# Patient Record
Sex: MALE | Race: WHITE | NOT HISPANIC OR LATINO | URBAN - NONMETROPOLITAN AREA
[De-identification: names, ages, dates, MRNs, and addresses within clinical notes are randomized per-mention and may not be internally consistent; named-entity substitution may affect disease eponyms.]

---

## 2019-12-31 ENCOUNTER — APPOINTMENT (RX ONLY)
Dept: URBAN - NONMETROPOLITAN AREA CLINIC 13 | Facility: CLINIC | Age: 24
Setting detail: DERMATOLOGY
End: 2019-12-31

## 2019-12-31 VITALS — HEIGHT: 68 IN | WEIGHT: 235 LBS

## 2019-12-31 DIAGNOSIS — L738 OTHER SPECIFIED DISEASES OF HAIR AND HAIR FOLLICLES: ICD-10-CM

## 2019-12-31 DIAGNOSIS — B35.6 TINEA CRURIS: ICD-10-CM

## 2019-12-31 DIAGNOSIS — L259 CONTACT DERMATITIS AND OTHER ECZEMA, UNSPECIFIED CAUSE: ICD-10-CM

## 2019-12-31 DIAGNOSIS — L663 OTHER SPECIFIED DISEASES OF HAIR AND HAIR FOLLICLES: ICD-10-CM

## 2019-12-31 DIAGNOSIS — L73.9 FOLLICULAR DISORDER, UNSPECIFIED: ICD-10-CM

## 2019-12-31 PROBLEM — L23.9 ALLERGIC CONTACT DERMATITIS, UNSPECIFIED CAUSE: Status: ACTIVE | Noted: 2019-12-31

## 2019-12-31 PROBLEM — L02.423 FURUNCLE OF RIGHT UPPER LIMB: Status: ACTIVE | Noted: 2019-12-31

## 2019-12-31 PROCEDURE — 99213 OFFICE O/P EST LOW 20 MIN: CPT

## 2019-12-31 PROCEDURE — ? COUNSELING

## 2019-12-31 PROCEDURE — ? PRESCRIPTION

## 2019-12-31 RX ORDER — NYSTATIN 100000 [USP'U]/G
APPLY TO AFFECTED AREAS POWDER TOPICAL QD
Qty: 1 | Refills: 2 | Status: ERX | COMMUNITY
Start: 2019-12-31

## 2019-12-31 RX ADMIN — NYSTATIN APPLY TO AFFECTED AREAS: 100000 POWDER TOPICAL at 00:00

## 2019-12-31 ASSESSMENT — LOCATION DETAILED DESCRIPTION DERM
LOCATION DETAILED: RIGHT VENTRAL DISTAL FOREARM
LOCATION DETAILED: SUPRAPUBIC SKIN

## 2019-12-31 ASSESSMENT — LOCATION ZONE DERM
LOCATION ZONE: TRUNK
LOCATION ZONE: ARM

## 2019-12-31 ASSESSMENT — LOCATION SIMPLE DESCRIPTION DERM
LOCATION SIMPLE: RIGHT FOREARM
LOCATION SIMPLE: GROIN

## 2019-12-31 NOTE — HPI: RASH
What Type Of Note Output Would You Prefer (Optional)?: Standard Output
Is The Patient Presenting As Previously Scheduled?: No, they are a work-in
How Severe Is Your Rash?: mild
Is This A New Presentation, Or A Follow-Up?: Rash
Additional History: HE ONLY COVERED TATOO WITH SERAN WRAP FOR 2 HOURS AFTER TATOO. THEY ONLY USED VASELINE.

## 2019-12-31 NOTE — PROCEDURE: COUNSELING
Patient Specific Counseling (Will Not Stick From Patient To Patient): DISCUSSED HE MOSTLY DID REACT TO AN INGREDIENT USED IN THE PROCESS OF HIS TATOO.\\n\\nHE IS UNAWARE IF ANY SURGICAL SOAP WAS USED TO PREP TATOO OR ONLY ALCOHOL. \\n\\nHE DOES KNOW THAT THE ARTIST USED SHAVING CREAM TO SHAVE THE AREA. DISCUSSED IT COULD BE AN INGREDIENT IN THE SHAVING CREAM THAT CAUSED THIS. ADVISED HIM TO DO A TEST SPOT ON HIS OTHER ARM.\\n\\nALSO COUNSELED HIM ON TATOO DYE. USUALLY THE RED, WHITE DYE ARE MORE REACTANT. \\n\\nDOES HAVE HX OF ECZEMA.\\n\\nHE CAN RETURN IN THE FUTURE FOR PATCH TESTING IF HE IS INTERESTED IN KNOWING EXACTLY WHAT HE IS ALLERGIC TO.\\n\\nSAMPLE GIVEN TODAY:CLOBETASOL OINTMENT BID X 3-5 DAYS.\\n\\n\\nIF FOLLICULITIS PUSTULES DO NOT IMPROVE IN THE NEXT FEW DAYS, HE WILL CALL FOR A PRESCRIPTION OF KEFLEX
Detail Level: Detailed
Patient Specific Counseling (Will Not Stick From Patient To Patient): Start Nystatin Powder\\n\\nRecommended OTC [id number] QD for maintenance